# Patient Record
Sex: FEMALE | URBAN - METROPOLITAN AREA
[De-identification: names, ages, dates, MRNs, and addresses within clinical notes are randomized per-mention and may not be internally consistent; named-entity substitution may affect disease eponyms.]

---

## 2024-01-01 ENCOUNTER — NURSE TRIAGE (OUTPATIENT)
Dept: ADMINISTRATIVE | Facility: CLINIC | Age: 69
End: 2024-01-01

## 2024-01-02 NOTE — TELEPHONE ENCOUNTER
Patient has sudden weakness and difficulty speaking. Also c/o hypertension after running out of BP meds. Advised per protocol to call 911 now. Caller verbalizes understanding.     Reason for Disposition   [1] SEVERE weakness (i.e., unable to walk or barely able to walk, requires support) AND [2] new-onset or worsening    Protocols used: Neurologic Deficit-A-AH